# Patient Record
Sex: MALE | Race: WHITE | Employment: OTHER | URBAN - METROPOLITAN AREA
[De-identification: names, ages, dates, MRNs, and addresses within clinical notes are randomized per-mention and may not be internally consistent; named-entity substitution may affect disease eponyms.]

---

## 2018-12-22 ENCOUNTER — OFFICE VISIT (OUTPATIENT)
Dept: URGENT CARE | Facility: URGENT CARE | Age: 73
End: 2018-12-22
Payer: COMMERCIAL

## 2018-12-22 VITALS
TEMPERATURE: 99 F | SYSTOLIC BLOOD PRESSURE: 131 MMHG | DIASTOLIC BLOOD PRESSURE: 75 MMHG | OXYGEN SATURATION: 97 % | WEIGHT: 191 LBS | RESPIRATION RATE: 16 BRPM | HEART RATE: 78 BPM

## 2018-12-22 DIAGNOSIS — J06.9 VIRAL URI: Primary | ICD-10-CM

## 2018-12-22 DIAGNOSIS — R07.0 THROAT PAIN: ICD-10-CM

## 2018-12-22 LAB
DEPRECATED S PYO AG THROAT QL EIA: NORMAL
SPECIMEN SOURCE: NORMAL

## 2018-12-22 PROCEDURE — 87880 STREP A ASSAY W/OPTIC: CPT | Performed by: PHYSICIAN ASSISTANT

## 2018-12-22 PROCEDURE — 99203 OFFICE O/P NEW LOW 30 MIN: CPT | Performed by: PHYSICIAN ASSISTANT

## 2018-12-22 PROCEDURE — 87081 CULTURE SCREEN ONLY: CPT | Performed by: PHYSICIAN ASSISTANT

## 2018-12-22 RX ORDER — SERTRALINE HYDROCHLORIDE 100 MG/1
TABLET, FILM COATED ORAL
COMMUNITY
Start: 2018-12-15

## 2018-12-22 RX ORDER — METOPROLOL SUCCINATE 25 MG/1
25 TABLET, EXTENDED RELEASE ORAL
COMMUNITY
Start: 2017-04-21

## 2018-12-22 RX ORDER — ASPIRIN 81 MG/1
81 TABLET ORAL
COMMUNITY

## 2018-12-22 ASSESSMENT — PAIN SCALES - GENERAL: PAINLEVEL: MILD PAIN (3)

## 2018-12-22 NOTE — PROGRESS NOTES
SUBJECTIVE:   Casimiro Villalta is a 73 year old male presenting with a chief complaint of   Chief Complaint   Patient presents with     Pharyngitis     x 3 days     URI     x 3 days   .    URI Adult    Onset of symptoms was 3 day(s) ago.  Course of illness is waxing and waning.    Severity moderately severe  Current and Associated symptoms: sore throat, worse at night  Cough- non-productive  Admits to nasal congestion, runny nose  No fever. Admits to fatigue. Mild body aches. No nausea, vomiting. He is tolerating PO intake.   Treatment measures tried include Tylenol/Ibuprofen, decongestant.  Predisposing factors include: none  No known ill contacts.    ROS  See HPI    PMH:  History reviewed. No pertinent past medical history.  There is no problem list on file for this patient.        Current medications:  Current Outpatient Medications   Medication Sig Dispense Refill     aspirin 81 MG EC tablet Take 81 mg by mouth       metoprolol succinate ER (TOPROL-XL) 25 MG 24 hr tablet Take 25 mg by mouth       sertraline (ZOLOFT) 100 MG tablet          Surgical History:  History reviewed. No pertinent surgical history.    Family history:  History reviewed. No pertinent family history.      Social History:  Social History     Tobacco Use     Smoking status: Former Smoker     Smokeless tobacco: Never Used   Substance Use Topics     Alcohol use: Yes         OBJECTIVE  /75 (BP Location: Right arm, Patient Position: Chair, Cuff Size: Adult Large)   Pulse 78   Temp 99  F (37.2  C) (Oral)   Resp 16   Wt 86.6 kg (191 lb)   SpO2 97%     General: alert, appears generally well, NAD. Afebrile.  Skin: no suspicious lesions or rashes.  HEENT: Normocephalic.   Eyes: conjunctiva clear.   Ears: TMs pearly, translucent bilaterally.   Nose: No rhinorrhea.  Oropharynx: MMM. ++ posterior pharyngeal erythema, no petechiae or exudate. No tonsillar hypertrophy. Uvula midline.    Neck: supple, no lymphadenopathy.  Respiratory: No distress.  Equal inspiration to bilateral bases. No crackles wheeze, rhonchi, rales.   Cardiovascular: RRR. No murmurs, clicks, gallups, or rub.   Psychiatric: mentation appears normal and affect normal/bright           Labs:  Results for orders placed or performed in visit on 12/22/18 (from the past 24 hour(s))   Rapid strep screen   Result Value Ref Range    Specimen Description Throat     Rapid Strep A Screen       NEGATIVE: No Group A streptococcal antigen detected by immunoassay, await culture report.               ASSESSMENT/PLAN:    ICD-10-CM    1. Viral URI J06.9    2. Throat pain R07.0 Rapid strep screen     Beta strep group A culture           Medical Decision Making:    Differential Diagnosis:  -Strep  -viral URI    Serious Comorbid Conditions: none    RST negative today. No tonsillitis on exam. Patient appears well and is tolerating oral intake. No signs of peritonsillar or retropharyngeal abscess on exam. Clear lungs. This is likely a viral infection. Discussed likely viral etiology with patient/parent and recommended supportive cares. We will follow up on overnight Strep result tomorrow.      At the end of the encounter, I discussed all available results, as well as the diagnosis and any associated medications. I discussed red flags for immediate return to clinic/ER, as well as indications for follow up. Refer to patient instructions below, which were all addressed with patient. Patient understood and agreed to plan. Patient was appropriate for discharge.      Patient Instructions     Rapid Strep test was negative.   We will run the Strep culture and if this returns positive in 24-48 hours, we will notify you and call in antibiotic prescription.    Symptoms are likely due to viral infection that will resolve on its own in 3-7 days.    May continue with symptomatic treatments including:  -salt water gargles  -warm beverages such as tea  -ibuprofen or Tylenol for pain or fever  -Cepacol throat drops  -Flonase nasal  spray    If fevers not coming down with Tylenol or ibuprofen, shortness of breath, not tolerating oral liquid intake, drooling, or stiff neck, return for evaluation immediately. Otherwise, if no improvement in the next week, follow up with primary care provider.      At [facility], we strive to deliver an exceptional experience to you, every time we see you.  If you receive a survey in the mail, please send us back your thoughts. We really do value your feedback.    Based on your medical history, these are the current health maintenance/preventive care services that you are due for (some may have been done at this visit.)  Health Maintenance Due   Topic Date Due     PHQ-2 Q1 YR  01/29/1958     HEPATITIS C SCREENING  01/29/1964     DTAP/TDAP/TD IMMUNIZATION (1 - Tdap) 01/29/1971     LIPID SCREEN Q5 YR MALE (SYSTEM ASSIGNED)  01/29/1981     COLON CANCER SCREEN (SYSTEM ASSIGNED)  01/29/1996     ZOSTER IMMUNIZATION (1 of 2) 01/29/1996     ADVANCE DIRECTIVE PLANNING Q5 YRS  01/29/2001     FALL RISK ASSESSMENT  01/29/2011     PNEUMOVAX IMMUNIZATION 65+ LOW/MEDIUM RISK (1 of 2 - PCV13) 01/29/2011     AORTIC ANEURYSM SCREENING (SYSTEM ASSIGNED)  01/29/2011     INFLUENZA VACCINE (1) 09/01/2018         Suggested websites for health information:  Www.[facility]org : Up to date and easily searchable information on multiple topics.  Www.medlineplus.gov : medication info, interactive tutorials, watch real surgeries online  Www.familydoctor.org : good info from the Academy of Family Physicians  Www.cdc.gov : public health info, travel advisories, epidemics (H1N1)  Www.aap.org : children's health info, normal development, vaccinations  Www.health.state.[place] : [place] dept of health, public health issues in [place], N1N1    Your care team:                            Family Medicine Internal Medicine   MD Esteban Jessica MD Shantel Branch-Fleming, MD Katya Georgiev PA-C Nam Ho, MD Pediatrics   [facility]  CHRIS Baker, JACQUI Aguirre APRMD Lainey Lopez CNP, MD Deborah Mielke, MD Kim Thein, APRN CNP      Clinic hours: Monday - Thursday 7 am-7 pm; Fridays 7 am-5 pm.   Urgent care: Monday - Friday 11 am-9 pm; Saturday and Sunday 9 am-5 pm.  Pharmacy : Monday -Thursday 8 am-8 pm; Friday 8 am-6 pm; Saturday and Sunday 9 am-5 pm.     Clinic: [phone]   Pharmacy: [phone]              Modesta Dubon PA-C

## 2018-12-22 NOTE — PATIENT INSTRUCTIONS
Rapid Strep test was negative.   We will run the Strep culture and if this returns positive in 24-48 hours, we will notify you and call in antibiotic prescription.    Symptoms are likely due to viral infection that will resolve on its own in 3-7 days.    May continue with symptomatic treatments including:  -salt water gargles  -warm beverages such as tea  -ibuprofen or Tylenol for pain or fever  -Cepacol throat drops  -Flonase nasal spray    If fevers not coming down with Tylenol or ibuprofen, shortness of breath, not tolerating oral liquid intake, drooling, or stiff neck, return for evaluation immediately. Otherwise, if no improvement in the next week, follow up with primary care provider.      At [facility], we strive to deliver an exceptional experience to you, every time we see you.  If you receive a survey in the mail, please send us back your thoughts. We really do value your feedback.    Based on your medical history, these are the current health maintenance/preventive care services that you are due for (some may have been done at this visit.)  Health Maintenance Due   Topic Date Due     PHQ-2 Q1 YR  01/29/1958     HEPATITIS C SCREENING  01/29/1964     DTAP/TDAP/TD IMMUNIZATION (1 - Tdap) 01/29/1971     LIPID SCREEN Q5 YR MALE (SYSTEM ASSIGNED)  01/29/1981     COLON CANCER SCREEN (SYSTEM ASSIGNED)  01/29/1996     ZOSTER IMMUNIZATION (1 of 2) 01/29/1996     ADVANCE DIRECTIVE PLANNING Q5 YRS  01/29/2001     FALL RISK ASSESSMENT  01/29/2011     PNEUMOVAX IMMUNIZATION 65+ LOW/MEDIUM RISK (1 of 2 - PCV13) 01/29/2011     AORTIC ANEURYSM SCREENING (SYSTEM ASSIGNED)  01/29/2011     INFLUENZA VACCINE (1) 09/01/2018         Suggested websites for health information:  Www.[employer].org : Up to date and easily searchable information on multiple topics.  Www.medlineplus.gov : medication info, interactive tutorials, watch real surgeries online  Www.familydoctor.org : good info from the Academy of  Family Physicians  Www.cdc.gov : public health info, travel advisories, epidemics (H1N1)  Www.aap.org : children's health info, normal development, vaccinations  Www.health.[facility].[place].us : [place] dept of health, public health issues in [place], N1N1    Your care team:                            Family Medicine Internal Medicine   MD Esteban Jessica MD Shantel Branch-Fleming, MD Katya Georgiev PA-C Nam Ho, MD Pediatrics   CHRIS Dong, JACQUI Aguirre APRN MD Lainey Hurtado MD Deborah Mielke, MD Kim Thein, APRN CNP      Clinic hours: Monday - Thursday 7 am-7 pm; Fridays 7 am-5 pm.   Urgent care: Monday - Friday 11 am-9 pm; Saturday and Sunday 9 am-5 pm.  Pharmacy : Monday -Thursday 8 am-8 pm; Friday 8 am-6 pm; Saturday and Sunday 9 am-5 pm.     Clinic: [phone]   Pharmacy: [phone]

## 2018-12-23 LAB
BACTERIA SPEC CULT: NORMAL
SPECIMEN SOURCE: NORMAL